# Patient Record
Sex: MALE | Race: WHITE | NOT HISPANIC OR LATINO | Employment: FULL TIME | URBAN - METROPOLITAN AREA
[De-identification: names, ages, dates, MRNs, and addresses within clinical notes are randomized per-mention and may not be internally consistent; named-entity substitution may affect disease eponyms.]

---

## 2022-04-14 DIAGNOSIS — N18.30 STAGE 3 CHRONIC KIDNEY DISEASE, UNSPECIFIED WHETHER STAGE 3A OR 3B CKD: Primary | ICD-10-CM

## 2022-04-14 RX ORDER — ACETAMINOPHEN 500 MG
2000 TABLET ORAL
COMMUNITY

## 2022-04-14 RX ORDER — LISINOPRIL AND HYDROCHLOROTHIAZIDE 10; 12.5 MG/1; MG/1
1 TABLET ORAL DAILY
COMMUNITY

## 2022-04-14 RX ORDER — RISANKIZUMAB-RZAA 75 MG/0.83
KIT SUBCUTANEOUS
COMMUNITY

## 2022-04-17 PROBLEM — E55.9 VITAMIN D DEFICIENCY, UNSPECIFIED: Status: ACTIVE | Noted: 2022-04-17

## 2022-04-17 PROBLEM — N18.31 STAGE 3A CHRONIC KIDNEY DISEASE: Status: ACTIVE | Noted: 2022-04-17

## 2022-04-17 PROBLEM — I12.9 RENAL HYPERTENSION: Status: ACTIVE | Noted: 2022-04-17

## 2022-04-17 PROBLEM — N02.B9 IGA NEPHROPATHY: Status: ACTIVE | Noted: 2022-04-17

## 2022-04-17 NOTE — PROGRESS NOTES
Pt Name:  Lai Wisdom  Pt :  1955  Pt MRN:  [id number]    Date: 2022    Reason for visit:     Follow up visit for Chronic Kidney Disease.    Serum creatinine 1.49, GFR 48, CKD stage 3a.    Chief Complaint:     The patient denies any complaints today.      Assessment & Plan:      Problem #1.  IgA nephropathy    Assessment:     Asymptomatic    Plan:    As in the plan for Problem #3.     Problem #2.  Hypertension    Assessment:     Reasonably controlled   Plan:    2 g sodium diet, lisinopril-hydrochlorothiazide 10-12.5 mg by mouth daily.     Problem #3.  Chronic Kidney Disease Stage 3a    Assessment:     Asymptomatic   Plan:    Conservative, non-dialysis, nephrologic managements.    Continue to provide the patient with reading material and verbal instruction regarding the kidney disease problem.    Return for follow-up in 6 months with repeat kidney lab work done before the visit.         HPI:    This is the 6th [facility] [place] visit for this 66-year-old man since 2/10/2012 when he was followed for his IgA nephropathy.  He was referred by Dr. Reid Ozuna on 2019 with the observation that he has calculated glomerular filtration rate has declined by about 10 points since , and that he had he had developed enough of a problem with his blood pressure and required the institution of lisinopril HCT earlier in the year .     In the clinic today for follow-up of the status of her kidney function, he does not have azotemic symptoms. Specifically, he does not have absent appetite, nausea, chest pain, shortness of breath, lying flat to sleep at night, absent energy, swelling, or any trouble thinking.    From the standpoint of the risk factors for the development of a kidney function problem, the patient has IgA nephropathy and hypertension.      History:     Past Medical History:   Diagnosis Date    Cancer     Kidney hematoma     Kidney stones     Personal history of colonic  polyps     Prostate cancer     Proteinuria     Psoriasis     Rheumatoid arthritis, unspecified     Unspecified hemorrhoids      Past Surgical History:   Procedure Laterality Date    COLONOSCOPY  2007    EXTRACORPOREAL SHOCK WAVE LITHOTRIPSY      PROSTATE BIOPSY      PROSTATECTOMY       Family History   Problem Relation Age of Onset    Cancer Father     Hypertension Father     Esophageal cancer Father     Crohn's disease Brother      Social History     Substance and Sexual Activity   Alcohol Use Yes    Alcohol/week: 4.0 standard drinks    Types: 4 Standard drinks or equivalent per week     Social History     Substance and Sexual Activity   Drug Use Never     Social History     Substance and Sexual Activity   Sexual Activity Not on file     has no history on file for sexual activity.  Social History     Tobacco Use   Smoking Status Former Smoker    Quit date: 1982    Years since quittin.2   Smokeless Tobacco Never Used       Allergies:    Review of patient's allergies indicates:  No Known Allergies      Current Outpatient Medications:     BIOTIN ORAL, Take by mouth., Disp: , Rfl:     cholecalciferol, vitamin D3, (VITAMIN D3) 50 mcg (2,000 unit) Cap capsule, Take 2,000 Units by mouth., Disp: , Rfl:     lisinopriL-hydrochlorothiazide (PRINZIDE,ZESTORETIC) 10-12.5 mg per tablet, Take 1 tablet by mouth once daily., Disp: , Rfl:     OMEGA-3 FATTY ACIDS-FISH OIL ORAL, Take 1 capsule by mouth once daily., Disp: , Rfl:     risankizumab-rzaa (SKYRIZI) 150mg/1.66mL(75 mg/0.83 mL x2) subcutaneous injection, Inject into the skin. Pt takes every 12 weeks, Disp: , Rfl:     ROS:     Constitutional:  Denies fever or chills   Eyes:  Denies change in visual acuity   HENT:  Denies nasal congestion or sore throat   Respiratory:  As in the history of the present illness.   Cardiovascular:  As in the history of the present illness.   GI:  As in the history of the present illness.    Musculoskeletal:   "Denies back pain or joint pain   Integument:  Denies rash   Neurologic:  Denies headache, focal weakness or sensory changes   Endocrine:  Denies polyuria or polydipsia   Lymphatic:  Denies swollen glands   Psychiatric:  Denies depression or anxiety    Physical Exam:     Vitals:   Vitals:    04/20/22 1508   BP: 135/74   Pulse: 65   Temp: 98 °F (36.7 °C)   SpO2: 97%   Weight: 88.5 kg (195 lb 3.2 oz)   Height: 5' 9" (1.753 m)   PainSc: 0-No pain     Body mass index is 28.83 kg/m².    Constitutional:  Well developed, well nourished, and in no acute distress   Eyes:  PERRLA, conjunctiva normal   HENT:  Atraumatic, external ears normal, nose normal.  Neck: There is no jugular venous distension or thyromegaly.   Respiratory:  No respiratory distress, normal breath sounds, no rales, no wheezing   Cardiovascular:  Normal rate,and a regular rhythm, no murmurs, no gallops, no rub, and no edema.  GI:  Normal bowel sounds.  Musculoskeletal:  No deformities.   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  Neurologic:  Alert & oriented x 3, CN 2-12 normal, normal motor function, and no asterixis.   Psychiatric:  Speech and behavior appropriate.      Labs/Tests:    Lab Results   Component Value Date    GLUCOSE Negative 07/08/2021    HGB 14.4 04/18/2022    HEPBSAG NON-REACTIVE 09/10/2020    TRIG 82 07/08/2021    CHOL 173 07/08/2021    HDL 57 07/08/2021    LDLCALC 100 07/08/2021    LABVLDL 16 07/08/2021     odium 136 - 147 mmol/L 140    Potassium 3.5 - 5.1 mmol/L 4.4    Chloride 98 - 107 mmol/L 105    CO2 20 - 31 mmol/L 28    Glucose 70 - 99 mg/dL 108 High     BUN 9 - 23 mg/dL 23    Creatinine 0.70 - 1.30 mg/dL 1.49 High     Calcium 8.3 - 10.6 mg/dL 9.7    Phosphorus Level 2.4 - 5.1 mg/dL 2.7    Albumin Level 3.2 - 4.8 g/dL 4.4    eGFR >90 mL/min/1.73m2 48 Low        Hemoglobin 11.9 - 16.8 g/dL 14.4      Urine Protein/Creatinine Ratio <0.2 mg of protein/mg of creatinine " 0.1      PTH, Intact 15 - 65 pg/mL 47       15 - 65 pg/mL 47         Follow Up:     Return for follow-up in 6 months with repeat kidney lab work done before the visit.      This note was created using the voice recognition software currently available to the Medical Staff of the [facility] and its health care facilities. All of the best efforts undertaken to edit the product of that use shall necessarily fall short from time to time. Viewers and reviewers of the product of its use are encouraged to contact this provider for clarification when, and if, the product message is unclear.

## 2022-04-20 ENCOUNTER — OFFICE VISIT (OUTPATIENT)
Dept: NEPHROLOGY | Facility: CLINIC | Age: 67
End: 2022-04-20
Payer: COMMERCIAL

## 2022-04-20 VITALS
SYSTOLIC BLOOD PRESSURE: 135 MMHG | TEMPERATURE: 98 F | HEIGHT: 69 IN | HEART RATE: 65 BPM | OXYGEN SATURATION: 97 % | BODY MASS INDEX: 28.91 KG/M2 | WEIGHT: 195.19 LBS | DIASTOLIC BLOOD PRESSURE: 74 MMHG

## 2022-04-20 DIAGNOSIS — N18.31 STAGE 3A CHRONIC KIDNEY DISEASE: Primary | ICD-10-CM

## 2022-04-20 DIAGNOSIS — I12.9 RENAL HYPERTENSION: ICD-10-CM

## 2022-04-20 DIAGNOSIS — L40.9 PSORIASIS: ICD-10-CM

## 2022-04-20 DIAGNOSIS — E55.9 VITAMIN D DEFICIENCY, UNSPECIFIED: ICD-10-CM

## 2022-04-20 DIAGNOSIS — N02.B9 IGA NEPHROPATHY: ICD-10-CM

## 2022-04-20 PROCEDURE — 99214 OFFICE O/P EST MOD 30 MIN: CPT | Mod: ,,, | Performed by: INTERNAL MEDICINE

## 2022-04-20 PROCEDURE — 99214 PR OFFICE/OUTPT VISIT, EST, LEVL IV, 30-39 MIN: ICD-10-PCS | Mod: ,,, | Performed by: INTERNAL MEDICINE

## 2022-04-20 NOTE — PATIENT INSTRUCTIONS
The patient has been provided with their current level of kidney function including eGFR and creatinine, and he is asked to return for follow up in 6 months with repeat kidney lab work done before the visit. .     We discussed the potential for common complications of CKD including anemia, electrolyte abnormalities, abnormal fluid balance, mineral bone disease and malnutrition.     We discussed strategies to slow the progression of their kidney disease including:  Avoid nephrotoxic agents. Avoid over-the-counter and prescription NSAIDs (Ibuprofren, Motrin, Naproxyn, Aleve, Mobic, Celebrex, Toradol, Advil). All of these can worsen kidney function, elevate BP, cause fluid retention/swelling and elevate potassium. Avoid iodine contrast agents and gadolinium, which can worsen kidney function and/or cause kidney failure. Avoid phosphosoda bowel preps which can worsen kidney function.  Work to improve modifiable risk factors. Aim for good control of blood glucose without episodes of hypoglycemia. Notify the provider managing your diabetes if your blood glucose < 60. Aim for good blood pressure control without episodes of hypotension. Call the office if your systolic blood pressure is consistently < 110. Aim for good control of your cholesterol.  AIC goal <7.0  BP goal <130/80  LDL chol goal <70        Keeping these in goal range will help prevent progression of cardiovascular disease and chronic kidney disease.     We discussed dietary modifications:  Low sodium diet: 2 gm/d or less  Limit/avoid high potassium foods  Avoid potassium containing salt substitutes  Limit/avoid high phosphorus foods  Limit daily protein intake to 0.8-1 gm/kg of your ideal body weight.     We discussed lifestyle modifications:  Make sure you are drinking plenty of fluids--64 ounces (1/2 gallon) daily  Exercise at least 30 minutes 5 x per week (total 150 minutes per week), example brisk walking  Achieve and maintain a healthy weight (BMI  20-25)  Limit alcohol consumption to <2 drinks per day  Stop smoking  Make sure you stay current on important vaccines-- pneumonia vaccines (Pneumovax and Prevnar), flu vaccine, Hepatitis B (especially patients nearing renal replacement therapy and planning hemodialysis) and Covid-19 vaccine.      Recommendations:  Monitor your BP at home daily and record.  Bring readings to your next appt.  Call the office if your BP is persistently >130/80.  Seek urgent medical attention with signs and symptoms of uremia - extreme weakness, fatigue, confusion, anorexia, metallic taste in mouth, hiccoughs, cramping, itching, chest pain, swelling, or trouble sleeping.

## 2022-10-24 NOTE — PROGRESS NOTES
Pt Name:  Lai Wisdom  Pt :  1955  Pt MRN:  [id number]    Date:  10/26/2022    Reason for visit:     Follow up visit for Chronic Kidney Disease.    Serum creatinine 1.33, GFR 55, CKD stage 3a.    Chief Complaint:     The patient denies any complaints today.      Assessment & Plan:      Problem #1.  IgA nephropathy    Assessment:     Asymptomatic    Plan:    As in the plan for Problem #3.     Problem #2.  Hypertension    Assessment:     Reasonably controlled   Plan:    2 g sodium diet, lisinopril-hydrochlorothiazide 10-12.5 mg by mouth daily.     Problem #3.  Chronic Kidney Disease Stage 3a    Assessment:     Asymptomatic   Plan:    Conservative, non-dialysis, nephrologic managements.    Continue to provide the patient with reading material and verbal instruction regarding the kidney disease problem.    Return for follow-up in 6 months with repeat kidney lab work done before the visit.         HPI:    This is the 6th [facility]patient [facility] [place] visit for this 67-year-old man since 2/10/2012 when he was followed for his IgA nephropathy.  He was referred by Dr. Reid Ozuna on 2019 with the observation that his calculated glomerular filtration rate had declined by about 10 points since , and that he had developed enough of a problem with his blood pressure and required the institution of lisinopril HCT earlier in the year .     At presentation to the clinic today for follow-up of the status of her kidney function, he does not have absent appetite, nausea, chest pain, shortness of breath, lying flat to sleep at night, absent energy, swelling, or any trouble thinking.      From the standpoint of the risk factors for the development of a kidney function problem, the patient has IgA nephropathy and hypertension.      History:     Past Medical History:   Diagnosis Date    Cancer     Kidney hematoma     Kidney stones     Personal history of colonic polyps     Prostate cancer     Proteinuria      Psoriasis     Rheumatoid arthritis, unspecified     Unspecified hemorrhoids      Past Surgical History:   Procedure Laterality Date    COLONOSCOPY  2007    EXTRACORPOREAL SHOCK WAVE LITHOTRIPSY      PROSTATE BIOPSY      PROSTATECTOMY       Family History   Problem Relation Age of Onset    Cancer Father     Hypertension Father     Esophageal cancer Father     Crohn's disease Brother      Social History     Substance and Sexual Activity   Alcohol Use Yes    Alcohol/week: 4.0 standard drinks    Types: 4 Standard drinks or equivalent per week     Social History     Substance and Sexual Activity   Drug Use Never     Social History     Substance and Sexual Activity   Sexual Activity Yes    Partners: Female     reports being sexually active and has had partner(s) who are female.  Social History     Tobacco Use   Smoking Status Former    Types: Cigarettes    Quit date: 1982    Years since quittin.7   Smokeless Tobacco Never       Allergies:    Review of patient's allergies indicates:  No Known Allergies      Current Outpatient Medications:     cholecalciferol, vitamin D3, (VITAMIN D3) 50 mcg (2,000 unit) Cap capsule, Take 2,000 Units by mouth., Disp: , Rfl:     lisinopriL-hydrochlorothiazide (PRINZIDE,ZESTORETIC) 10-12.5 mg per tablet, Take 1 tablet by mouth once daily., Disp: , Rfl:     OMEGA-3 FATTY ACIDS-FISH OIL ORAL, Take 1 capsule by mouth once daily., Disp: , Rfl:     risankizumab-rzaa (SKYRIZI) 150mg/1.66mL(75 mg/0.83 mL x2) subcutaneous injection, Inject into the skin. Pt takes every 12 weeks, Disp: , Rfl:     BIOTIN ORAL, Take by mouth., Disp: , Rfl:     ROS:     Constitutional:  Denies fever or chills   Eyes:  Denies change in visual acuity   HENT:  Denies nasal congestion or sore throat   Respiratory:  As in the history of the present illness.   Cardiovascular:  As in the history of the present illness.   GI:  As in the history of the present illness.    Musculoskeletal:  Denies back pain or  "joint pain   Integument:  Denies rash   Neurologic:  Denies headache, focal weakness or sensory changes   Endocrine:  Denies polyuria or polydipsia   Lymphatic:  Denies swollen glands   Psychiatric:  Denies depression or anxiety    Physical Exam:     Vitals:   Vitals:    10/26/22 1452   BP: (!) 140/75   Pulse: 63   SpO2: 96%   Weight: 87.5 kg (193 lb)   Height: 5' 9" (1.753 m)       Body mass index is 28.5 kg/m².    Constitutional:  Well developed, well nourished, and in no acute distress   Eyes:  PERRLA, conjunctiva normal   HENT:  Atraumatic, external ears normal, nose normal.  Neck: There is no jugular venous distension or thyromegaly.   Respiratory:  No respiratory distress, normal breath sounds, no rales, no wheezing   Cardiovascular:  Normal rate,and a regular rhythm, no murmurs, no gallops, no rub, and no edema.  GI:  Normal bowel sounds.  Musculoskeletal:  No deformities.   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  Neurologic:  Alert & oriented x 3, CN 2-12 normal, normal motor function, and no asterixis.   Psychiatric:  Speech and behavior appropriate.      Labs/Tests:    Date: 10/20/2022    Na/K/Cl/CO2 = 139/4.1/104/31  BUN/Creat/GFR = 26/1.33/55  Ca/Phos/Alb/PTH = 9.4/2.9/4.0/49  U Prot/Creat =0.1  Hgb =13.9      Follow Up:     Return for follow-up in 6 months with repeat kidney lab work done before the visit.      This note was created using the voice recognition software currently available to the Medical Staff of the [facility] and its health care facilities. All of the best efforts undertaken to edit the product of that use shall necessarily fall short from time to time. Viewers and reviewers of the product of its use are encouraged to contact this provider for clarification when, and if, the product message is unclear.      "

## 2022-10-26 ENCOUNTER — OFFICE VISIT (OUTPATIENT)
Dept: NEPHROLOGY | Facility: CLINIC | Age: 67
End: 2022-10-26
Payer: COMMERCIAL

## 2022-10-26 VITALS
HEART RATE: 63 BPM | BODY MASS INDEX: 28.58 KG/M2 | WEIGHT: 193 LBS | HEIGHT: 69 IN | OXYGEN SATURATION: 96 % | SYSTOLIC BLOOD PRESSURE: 140 MMHG | DIASTOLIC BLOOD PRESSURE: 75 MMHG

## 2022-10-26 DIAGNOSIS — N18.31 STAGE 3A CHRONIC KIDNEY DISEASE: ICD-10-CM

## 2022-10-26 DIAGNOSIS — E55.9 VITAMIN D DEFICIENCY, UNSPECIFIED: ICD-10-CM

## 2022-10-26 DIAGNOSIS — I12.9 RENAL HYPERTENSION: ICD-10-CM

## 2022-10-26 DIAGNOSIS — N02.B9 IGA NEPHROPATHY: Primary | ICD-10-CM

## 2022-10-26 PROCEDURE — 99214 OFFICE O/P EST MOD 30 MIN: CPT | Mod: ,,, | Performed by: INTERNAL MEDICINE

## 2022-10-26 PROCEDURE — 99214 PR OFFICE/OUTPT VISIT, EST, LEVL IV, 30-39 MIN: ICD-10-PCS | Mod: ,,, | Performed by: INTERNAL MEDICINE

## 2022-10-26 NOTE — PATIENT INSTRUCTIONS
The patient has been provided with his current level of improved kidney function, and he is asked to return for follow-up, this time, in 6 months with repeat kidney lab work done before the visit.

## 2023-04-26 ENCOUNTER — OFFICE VISIT (OUTPATIENT)
Dept: NEPHROLOGY | Facility: CLINIC | Age: 68
End: 2023-04-26
Payer: COMMERCIAL

## 2023-04-26 VITALS
OXYGEN SATURATION: 96 % | HEART RATE: 61 BPM | BODY MASS INDEX: 29.62 KG/M2 | HEIGHT: 69 IN | SYSTOLIC BLOOD PRESSURE: 134 MMHG | DIASTOLIC BLOOD PRESSURE: 82 MMHG | WEIGHT: 200 LBS

## 2023-04-26 DIAGNOSIS — I12.9 RENAL HYPERTENSION: ICD-10-CM

## 2023-04-26 DIAGNOSIS — N02.B9 IGA NEPHROPATHY: ICD-10-CM

## 2023-04-26 DIAGNOSIS — N18.31 STAGE 3A CHRONIC KIDNEY DISEASE: Primary | ICD-10-CM

## 2023-04-26 PROCEDURE — 99213 PR OFFICE/OUTPT VISIT, EST, LEVL III, 20-29 MIN: ICD-10-PCS | Mod: S$GLB,,, | Performed by: INTERNAL MEDICINE

## 2023-04-26 PROCEDURE — 99213 OFFICE O/P EST LOW 20 MIN: CPT | Mod: S$GLB,,, | Performed by: INTERNAL MEDICINE

## 2023-04-26 NOTE — PATIENT INSTRUCTIONS
The patient has been provided with his current level of kidney function and he is asked to return for follow-up in 6 months with repeat kidney lab work done before the visit.    In the meantime, he is reminded that he needs to take in 74 oz of fluid a day at baseline and to increase this if he is, especially, out of doors in the heat.

## 2023-04-26 NOTE — PROGRESS NOTES
Pt Name:  Lai Wisdom  Pt :  1955  Pt MRN:  [id number]    Date:  2023    Reason for visit:     Follow up visit for Chronic Kidney Disease.    Serum creatinine 1.50, GFR 51, CKD stage 3a.    Chief Complaint:     The patient denies any complaints today.      Assessment & Plan:      Problem #1.  IgA nephropathy    Assessment:     Asymptomatic    Plan:    As in the plan for Problem #3.     Problem #2.  Hypertension    Assessment:     Reasonably controlled   Plan:    2 g sodium diet, lisinopril-hydrochlorothiazide 10-12.5 mg by mouth daily.     Problem #3.  Chronic Kidney Disease Stage 3a    Assessment:     Asymptomatic   Plan:    Conservative, non-dialysis, nephrologic managements.    Continue to provide the patient with reading material and verbal instruction regarding the kidney disease problem.    Return for follow-up in 6 months with repeat kidney lab work done before the visit.         HPI:    This is the 6th Outpatient [facility] [place] visit for this 67-year-old man since 2/10/2012 when he was followed for his IgA nephropathy.  He was referred by Dr. Reid Ozuna on 2019 with the observation that his calculated glomerular filtration rate had declined by about 10 points since , and that he had developed enough of a problem with his blood pressure and required the institution of lisinopril HCT earlier in the year .     At presentation to the clinic today for follow-up of the status of her kidney function, he does not have absent appetite, nausea, chest pain, shortness of breath, lying flat to sleep at night, absent energy, swelling, or any trouble thinking.      From the standpoint of the risk factors for the development of a kidney function problem, the patient has IgA nephropathy and hypertension.      History:     Past Medical History:   Diagnosis Date    Cancer     Kidney hematoma     Kidney stones     Personal history of colonic polyps     Prostate cancer     Proteinuria      Psoriasis     Rheumatoid arthritis, unspecified     Unspecified hemorrhoids      Past Surgical History:   Procedure Laterality Date    COLONOSCOPY  2007    EXTRACORPOREAL SHOCK WAVE LITHOTRIPSY      PROSTATE BIOPSY      PROSTATECTOMY       Family History   Problem Relation Age of Onset    Cancer Father     Hypertension Father     Esophageal cancer Father     Crohn's disease Brother      Social History     Substance and Sexual Activity   Alcohol Use Yes    Alcohol/week: 4.0 standard drinks    Types: 4 Standard drinks or equivalent per week     Social History     Substance and Sexual Activity   Drug Use Never     Social History     Substance and Sexual Activity   Sexual Activity Yes    Partners: Female     reports being sexually active and has had partner(s) who are female.  Social History     Tobacco Use   Smoking Status Former    Types: Cigarettes    Quit date: 1982    Years since quittin.2   Smokeless Tobacco Never       Allergies:    Review of patient's allergies indicates:  No Known Allergies      Current Outpatient Medications:     cholecalciferol, vitamin D3, (VITAMIN D3) 50 mcg (2,000 unit) Cap capsule, Take 2,000 Units by mouth., Disp: , Rfl:     lisinopriL-hydrochlorothiazide (PRINZIDE,ZESTORETIC) 10-12.5 mg per tablet, Take 1 tablet by mouth once daily., Disp: , Rfl:     OMEGA-3 FATTY ACIDS-FISH OIL ORAL, Take 1 capsule by mouth once daily., Disp: , Rfl:     risankizumab-rzaa (SKYRIZI) 150mg/1.66mL(75 mg/0.83 mL x2) subcutaneous injection, Inject into the skin. Pt takes every 12 weeks, Disp: , Rfl:     ROS:     Constitutional:  Denies fever or chills   Eyes:  Denies change in visual acuity   HENT:  Denies nasal congestion or sore throat   Respiratory:  As in the history of the present illness.   Cardiovascular:  As in the history of the present illness.   GI:  As in the history of the present illness.    Musculoskeletal:  Denies back pain or joint pain   Integument:  Denies rash  "  Neurologic:  Denies headache, focal weakness or sensory changes   Endocrine:  Denies polyuria or polydipsia   Lymphatic:  Denies swollen glands   Psychiatric:  Denies depression or anxiety    Physical Exam:     Vitals:   Vitals:    04/26/23 1517 04/26/23 1526   BP: (!) 157/84 134/82   Pulse: 61    SpO2: 96%    Weight: 90.7 kg (200 lb)    Height: 5' 9" (1.753 m)    PainSc: 0-No pain        Body mass index is 29.53 kg/m².    Constitutional:  Well developed, well nourished, and in no acute distress   Eyes:  PERRLA, conjunctiva normal   HENT:  Atraumatic, external ears normal, nose normal.  Neck: There is no jugular venous distension or thyromegaly.   Respiratory:  No respiratory distress, normal breath sounds, no rales, no wheezing   Cardiovascular:  Normal rate,and a regular rhythm, no murmurs, no gallops, no rub, and no edema.  GI:  Normal bowel sounds.  Musculoskeletal:  No deformities.   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  Neurologic:  Alert & oriented x 3, CN 2-12 normal, normal motor function, and no asterixis.   Psychiatric:  Speech and behavior appropriate.      Labs/Tests:    Date: 04/20/2023    Na/K/Cl/CO2 = 138/4.6/105/26  BUN/Creat/GFR = 24/1.50/51  Ca/Phos/Alb/PTH = 9.6/3.0/4.4/38  U Prot/Creat = 0.1  Hgb =15.1      Follow Up:     Return for follow-up in 6 months with repeat kidney lab work done before the visit.      This note was created using the voice recognition software currently available to the Medical Staff of the [facility] and its health care facilities. All of the best efforts undertaken to edit the product of that use shall necessarily fall short from time to time. Viewers and reviewers of the product of its use are encouraged to contact this provider for clarification when, and if, the product message is unclear.        "